# Patient Record
Sex: MALE | Race: BLACK OR AFRICAN AMERICAN | NOT HISPANIC OR LATINO | Employment: UNEMPLOYED | URBAN - METROPOLITAN AREA
[De-identification: names, ages, dates, MRNs, and addresses within clinical notes are randomized per-mention and may not be internally consistent; named-entity substitution may affect disease eponyms.]

---

## 2024-10-29 ENCOUNTER — HOSPITAL ENCOUNTER (EMERGENCY)
Facility: HOSPITAL | Age: 44
Discharge: HOME | End: 2024-10-29
Attending: EMERGENCY MEDICINE

## 2024-10-29 VITALS
HEART RATE: 74 BPM | DIASTOLIC BLOOD PRESSURE: 80 MMHG | TEMPERATURE: 99 F | OXYGEN SATURATION: 100 % | HEIGHT: 68 IN | WEIGHT: 170 LBS | RESPIRATION RATE: 18 BRPM | BODY MASS INDEX: 25.76 KG/M2 | SYSTOLIC BLOOD PRESSURE: 132 MMHG

## 2024-10-29 DIAGNOSIS — D64.9 ANEMIA, UNSPECIFIED TYPE: ICD-10-CM

## 2024-10-29 DIAGNOSIS — R21 RASH: ICD-10-CM

## 2024-10-29 DIAGNOSIS — R05.9 COUGH: ICD-10-CM

## 2024-10-29 DIAGNOSIS — J06.9 VIRAL URI WITH COUGH: Primary | ICD-10-CM

## 2024-10-29 DIAGNOSIS — R06.00 DYSPNEA, UNSPECIFIED TYPE: ICD-10-CM

## 2024-10-29 LAB
ALBUMIN SERPL BCP-MCNC: 4.1 G/DL (ref 3.5–5.2)
ALP SERPL-CCNC: 62 U/L (ref 40–150)
ALT SERPL W/O P-5'-P-CCNC: 15 U/L (ref 10–44)
ANION GAP SERPL CALC-SCNC: 8 MMOL/L (ref 8–16)
AST SERPL-CCNC: 20 U/L (ref 10–40)
BASOPHILS # BLD AUTO: 0.02 K/UL (ref 0–0.2)
BASOPHILS NFR BLD: 0.6 % (ref 0–1.9)
BILIRUB SERPL-MCNC: 0.4 MG/DL (ref 0.1–1)
BUN SERPL-MCNC: 14 MG/DL (ref 6–20)
CALCIUM SERPL-MCNC: 9.3 MG/DL (ref 8.7–10.5)
CHLORIDE SERPL-SCNC: 109 MMOL/L (ref 95–110)
CO2 SERPL-SCNC: 24 MMOL/L (ref 23–29)
CREAT SERPL-MCNC: 1.2 MG/DL (ref 0.5–1.4)
CTP QC/QA: YES
CTP QC/QA: YES
D DIMER PPP IA.FEU-MCNC: <0.19 MG/L FEU
DIFFERENTIAL METHOD BLD: ABNORMAL
EOSINOPHIL # BLD AUTO: 0 K/UL (ref 0–0.5)
EOSINOPHIL NFR BLD: 0.9 % (ref 0–8)
ERYTHROCYTE [DISTWIDTH] IN BLOOD BY AUTOMATED COUNT: 11.7 % (ref 11.5–14.5)
EST. GFR  (NO RACE VARIABLE): >60 ML/MIN/1.73 M^2
GLUCOSE SERPL-MCNC: 101 MG/DL (ref 70–110)
HCT VFR BLD AUTO: 39.8 % (ref 40–54)
HGB BLD-MCNC: 13.2 G/DL (ref 14–18)
IMM GRANULOCYTES # BLD AUTO: 0.01 K/UL (ref 0–0.04)
IMM GRANULOCYTES NFR BLD AUTO: 0.3 % (ref 0–0.5)
LYMPHOCYTES # BLD AUTO: 1.4 K/UL (ref 1–4.8)
LYMPHOCYTES NFR BLD: 41.5 % (ref 18–48)
MCH RBC QN AUTO: 29.6 PG (ref 27–31)
MCHC RBC AUTO-ENTMCNC: 33.2 G/DL (ref 32–36)
MCV RBC AUTO: 89 FL (ref 82–98)
MONOCYTES # BLD AUTO: 0.5 K/UL (ref 0.3–1)
MONOCYTES NFR BLD: 13.7 % (ref 4–15)
NEUTROPHILS # BLD AUTO: 1.4 K/UL (ref 1.8–7.7)
NEUTROPHILS NFR BLD: 43 % (ref 38–73)
NRBC BLD-RTO: 0 /100 WBC
PLATELET # BLD AUTO: 215 K/UL (ref 150–450)
PMV BLD AUTO: 9.5 FL (ref 9.2–12.9)
POC MOLECULAR INFLUENZA A AGN: NEGATIVE
POC MOLECULAR INFLUENZA B AGN: NEGATIVE
POTASSIUM SERPL-SCNC: 4.1 MMOL/L (ref 3.5–5.1)
PROT SERPL-MCNC: 7.7 G/DL (ref 6–8.4)
RBC # BLD AUTO: 4.46 M/UL (ref 4.6–6.2)
SARS-COV-2 RDRP RESP QL NAA+PROBE: NEGATIVE
SODIUM SERPL-SCNC: 141 MMOL/L (ref 136–145)
TREPONEMA PALLIDUM IGG+IGM AB [PRESENCE] IN SERUM OR PLASMA BY IMMUNOASSAY: REACTIVE
WBC # BLD AUTO: 3.35 K/UL (ref 3.9–12.7)

## 2024-10-29 PROCEDURE — 87635 SARS-COV-2 COVID-19 AMP PRB: CPT | Performed by: PHYSICIAN ASSISTANT

## 2024-10-29 PROCEDURE — 85025 COMPLETE CBC W/AUTO DIFF WBC: CPT | Performed by: PHYSICIAN ASSISTANT

## 2024-10-29 PROCEDURE — 99284 EMERGENCY DEPT VISIT MOD MDM: CPT | Mod: 25

## 2024-10-29 PROCEDURE — 87502 INFLUENZA DNA AMP PROBE: CPT

## 2024-10-29 PROCEDURE — 85379 FIBRIN DEGRADATION QUANT: CPT | Performed by: PHYSICIAN ASSISTANT

## 2024-10-29 PROCEDURE — 86593 SYPHILIS TEST NON-TREP QUANT: CPT | Performed by: PHYSICIAN ASSISTANT

## 2024-10-29 PROCEDURE — 86780 TREPONEMA PALLIDUM: CPT | Performed by: PHYSICIAN ASSISTANT

## 2024-10-29 PROCEDURE — 80053 COMPREHEN METABOLIC PANEL: CPT | Performed by: PHYSICIAN ASSISTANT

## 2024-10-29 PROCEDURE — 86592 SYPHILIS TEST NON-TREP QUAL: CPT | Performed by: PHYSICIAN ASSISTANT

## 2024-10-29 RX ORDER — BENZONATATE 100 MG/1
100 CAPSULE ORAL 3 TIMES DAILY PRN
Qty: 20 CAPSULE | Refills: 0 | Status: SHIPPED | OUTPATIENT
Start: 2024-10-29 | End: 2024-11-08

## 2024-10-29 RX ORDER — ONDANSETRON 4 MG/1
4 TABLET, FILM COATED ORAL EVERY 6 HOURS PRN
Qty: 12 TABLET | Refills: 0 | Status: SHIPPED | OUTPATIENT
Start: 2024-10-29

## 2024-10-29 RX ORDER — SALICYLIC ACID 0.03 G/ML
1 SHAMPOO TOPICAL DAILY
Qty: 325 ML | Refills: 0 | Status: SHIPPED | OUTPATIENT
Start: 2024-10-29

## 2024-10-29 RX ORDER — GUAIFENESIN 100 MG/5ML
100-200 SOLUTION ORAL EVERY 4 HOURS PRN
Qty: 60 ML | Refills: 0 | Status: SHIPPED | OUTPATIENT
Start: 2024-10-29 | End: 2024-11-08

## 2024-10-30 LAB — RPR SER QL: NORMAL

## 2024-11-01 ENCOUNTER — HOSPITAL ENCOUNTER (EMERGENCY)
Facility: HOSPITAL | Age: 44
Discharge: HOME | End: 2024-11-01
Attending: EMERGENCY MEDICINE

## 2024-11-01 VITALS
WEIGHT: 162 LBS | TEMPERATURE: 99 F | HEART RATE: 63 BPM | SYSTOLIC BLOOD PRESSURE: 130 MMHG | HEIGHT: 67 IN | OXYGEN SATURATION: 98 % | DIASTOLIC BLOOD PRESSURE: 86 MMHG | RESPIRATION RATE: 20 BRPM | BODY MASS INDEX: 25.43 KG/M2

## 2024-11-01 DIAGNOSIS — A53.9 SYPHILIS: Primary | ICD-10-CM

## 2024-11-01 LAB — T PALLIDUM AB SER QL IF: REACTIVE

## 2024-11-01 PROCEDURE — 63600175 PHARM REV CODE 636 W HCPCS: Mod: JZ,JG

## 2024-11-01 PROCEDURE — 96372 THER/PROPH/DIAG INJ SC/IM: CPT

## 2024-11-01 PROCEDURE — 99284 EMERGENCY DEPT VISIT MOD MDM: CPT | Mod: 25

## 2024-11-01 RX ADMIN — PENICILLIN G BENZATHINE 2.4 MILLION UNITS: 2400000 INJECTION, SUSPENSION INTRAMUSCULAR at 07:11

## 2024-11-01 NOTE — ED PROVIDER NOTES
Encounter Date: 11/1/2024       History     Chief Complaint   Patient presents with    Wound Check     Patient reports was called by a provider to come in for a shot      44-year-old male with no past medical history presents to ED for emergent evaluation of a penicillin shot.  Patient presented to this facility on 10/29/2024 where he had syphilis testing that tested positive.    He denies any fever, chills, chest pain, shortness of breath, abdominal pain, nausea, vomiting, diarrhea, dysuria, hematuria.  No other symptoms reported.    The history is provided by the patient. The history is limited by a language barrier. A  was used (Nurse Echols).     Review of patient's allergies indicates:  No Known Allergies  History reviewed. No pertinent past medical history.  History reviewed. No pertinent surgical history.  No family history on file.  Social History     Tobacco Use    Smoking status: Never    Smokeless tobacco: Never   Substance Use Topics    Alcohol use: Not Currently    Drug use: Never     Review of Systems   Constitutional:  Negative for chills and fever.   HENT:  Negative for congestion, ear pain, rhinorrhea and sore throat.    Eyes:  Negative for redness.   Respiratory:  Negative for cough and shortness of breath.    Cardiovascular:  Negative for chest pain.   Gastrointestinal:  Negative for abdominal pain, diarrhea, nausea and vomiting.   Genitourinary:  Negative for decreased urine volume, difficulty urinating, dysuria, frequency, hematuria and urgency.   Musculoskeletal:  Negative for back pain and neck pain.   Skin:  Positive for rash.   Neurological:  Negative for headaches.   Psychiatric/Behavioral:  Negative for confusion.        Physical Exam     Initial Vitals [11/01/24 1752]   BP Pulse Resp Temp SpO2   109/62 64 18 99 °F (37.2 °C) 98 %      MAP       --         Physical Exam    Nursing note and vitals reviewed.  Constitutional: He appears well-developed and well-nourished.  He is not diaphoretic.  Non-toxic appearance. No distress.   HENT:   Head: Normocephalic and atraumatic.   Right Ear: Hearing, tympanic membrane, external ear and ear canal normal. Tympanic membrane is not perforated, not erythematous and not bulging.   Left Ear: Hearing, tympanic membrane, external ear and ear canal normal. Tympanic membrane is not perforated, not erythematous and not bulging.   Nose: Nose normal. Mouth/Throat: Uvula is midline and oropharynx is clear and moist.   Eyes: Conjunctivae and EOM are normal.   Neck: Neck supple.   Normal range of motion.   Full passive range of motion without pain.     Cardiovascular:            Pulses:       Radial pulses are 2+ on the right side and 2+ on the left side.   Pulmonary/Chest: Effort normal and breath sounds normal. No respiratory distress. He has no decreased breath sounds.   Abdominal: Abdomen is soft and flat. There is no abdominal tenderness.   No right CVA tenderness.  No left CVA tenderness. There is no rebound and no guarding.   Musculoskeletal:      Cervical back: Full passive range of motion without pain, normal range of motion and neck supple. No rigidity.     Neurological: He is alert. No cranial nerve deficit.   Neuro intact.  Strength and sensation intact to bilateral upper and lower extremities.   Skin: Skin is warm and dry. No rash noted.         ED Course   Procedures  Labs Reviewed - No data to display       Imaging Results    None          Medications   penicillin G benzathine (BICILLIN LA) injection 2.4 Million Units (2.4 Million Units Intramuscular Given 11/1/24 1907)     Medical Decision Making  This is a 44-year-old male with no past medical history presents to ED for emergent evaluation of a penicillin shot.  Patient presented to this facility on 10/29/2024 where he had syphilis testing that tested positive.    He denies any fever, chills, chest pain, shortness of breath, abdominal pain, nausea, vomiting, diarrhea, dysuria, hematuria.   No other symptoms reported.    On physical exam, patient is well-appearing and in no acute distress.  Nontoxic appearing.  Lungs are clear to auscultation bilaterally.  Abdomen is soft and nontender.  No guarding, rigidity, rebound.  2+ radial pulses bilaterally.  Posterior oropharynx is not erythematous.  No edema or exudate.  Uvula midline.  Bilateral tympanic membrane is normal.  No erythema, bulging, or perforations.  Neuro intact.  Strength and sensation intact bilateral upper and lower extremities. Looking at patient's previous records on 10/29/2024 he was complaining of shortness of breath.  Given the symptoms I will treat patient for late stage syphilis.    Bicillin ordered.  Advised patient to come back once per week a for repeat bicillin shot for 3 weeks.  Advised patient to tell his sexual partners to also be tested and treated.  Urged prompt follow-up with PCP for further evaluation.    Strict return precautions given. I discussed with the patient/family the diagnosis, treatment plan, indications for return to the emergency department, and for expected follow-up. The patient/family verbalized an understanding. The patient/family is asked if there are any questions or concerns. We discuss the case, until all issues are addressed to the patient/family's satisfaction. Patient/family understands and is agreeable to the plan. Patient is stable and ready for discharge.      Risk  Prescription drug management.                                      Clinical Impression:  Final diagnoses:  [A53.9] Syphilis (Primary)          ED Disposition Condition    Discharge Stable          ED Prescriptions    None       Follow-up Information       Follow up With Specialties Details Why Contact [facility] -  Schedule an appointment as soon as possible for a visit in 2 days for further evaluation [address]  [place] [address]  [phone]      [facility] [place] Emergency Medicine In 2 days  If symptoms worsen [address] [facility] [place] [id number] [phone]             Nehemias Marroquin PA-C  11/01/24 1911

## 2024-11-01 NOTE — ED TRIAGE NOTES
"Pt to ED for evaluation s/p "being called by a doctor regarding positive test results". Pt was seen in the ED on 10/29/24 for rash to upper torso, and Per chart also tested (+) for FTA antibodies IgG and IgM, and (+) Treponema Pallidium Antibodies IgG and IgM. Pt denies any new symptoms today.   "

## 2024-11-02 NOTE — DISCHARGE INSTRUCTIONS
Tanpri retounen nan etablisman sa a lavelle 11/8/24 ak 11/15/24 santos repete piki penisilin.     Tanpri retounen nan Depatman Ijans santos nenpòt nouvo sentòm oswa sentòm ki jimenez pi grav tankou: lafyèv, doulè nan pwatrin, souf kout, pèt konesans, vètij, feblès, oswa nenpòt lòt enkyetid.     Tanpri robi Founisè Swen Prensipal ou a nan semèn nan. Si ou matt hceek, ou gldays cheek ki endike lavelle papye egzeyat ou a oswa ou ka rele goodo randevou klinik [facility] nan [phone] santos pran jay cheek.

## 2024-11-08 ENCOUNTER — HOSPITAL ENCOUNTER (EMERGENCY)
Facility: HOSPITAL | Age: 44
Discharge: HOME | End: 2024-11-08
Attending: EMERGENCY MEDICINE

## 2024-11-08 VITALS
TEMPERATURE: 99 F | OXYGEN SATURATION: 99 % | RESPIRATION RATE: 19 BRPM | BODY MASS INDEX: 26.48 KG/M2 | SYSTOLIC BLOOD PRESSURE: 129 MMHG | DIASTOLIC BLOOD PRESSURE: 84 MMHG | HEART RATE: 76 BPM | WEIGHT: 169.06 LBS

## 2024-11-08 DIAGNOSIS — J06.9 VIRAL URI WITH COUGH: ICD-10-CM

## 2024-11-08 DIAGNOSIS — A53.9 SYPHILIS: Primary | ICD-10-CM

## 2024-11-08 PROCEDURE — 96372 THER/PROPH/DIAG INJ SC/IM: CPT

## 2024-11-08 PROCEDURE — 63600175 PHARM REV CODE 636 W HCPCS: Mod: JZ,JG

## 2024-11-08 PROCEDURE — 99284 EMERGENCY DEPT VISIT MOD MDM: CPT | Mod: 25

## 2024-11-08 RX ORDER — FLUTICASONE PROPIONATE 50 MCG
1 SPRAY, SUSPENSION (ML) NASAL 2 TIMES DAILY PRN
Qty: 15 G | Refills: 0 | Status: SHIPPED | OUTPATIENT
Start: 2024-11-08

## 2024-11-08 RX ORDER — PREDNISONE 20 MG/1
40 TABLET ORAL DAILY
Qty: 10 TABLET | Refills: 0 | Status: SHIPPED | OUTPATIENT
Start: 2024-11-08 | End: 2024-11-13

## 2024-11-08 RX ORDER — CETIRIZINE HYDROCHLORIDE 10 MG/1
10 TABLET ORAL DAILY
Qty: 30 TABLET | Refills: 0 | Status: SHIPPED | OUTPATIENT
Start: 2024-11-08 | End: 2024-12-08

## 2024-11-08 RX ORDER — BENZONATATE 200 MG/1
200 CAPSULE ORAL 3 TIMES DAILY PRN
Qty: 20 CAPSULE | Refills: 0 | Status: SHIPPED | OUTPATIENT
Start: 2024-11-08

## 2024-11-08 RX ADMIN — PENICILLIN G BENZATHINE 2.4 MILLION UNITS: 1200000 INJECTION, SUSPENSION INTRAMUSCULAR at 03:11

## 2024-11-08 NOTE — DISCHARGE INSTRUCTIONS
Retounen nan 1 semèn santos 5yèm piki ou a santos trete sifilis    Asire w ou bwè anpil likid santos w rete idrate.  Ou ka pran Tessalon Perles roney sa nesesè santos tous.  Asire w ke w pran chak melonie Zyrtec nan maten yo ak Flonase espre nan nen.  Ranpli imani 5 melonie nan prednisone.  Santos liz w tata w pi byen:   o Sèvi ak yon imidite vapè leia santos evite respire lè sèk.   o Sèvi ak sirèt di oswa gout santos tous santos kalme gòj fè mal ak tous.   o Gargari ak dlo sale (melanje 1/2 ti kiyè sèl ak 1 tas dlo tyèd) kèk fwa pa melonie.   o Flite vapè dlo sale nan chak twou nen. Nenpòt espre saline nòmal ap travay.   o Siwo likid cho santos kenbe gòj ou imid.   o Pran douch cho ak vapè santos liz kalme tous la.  Pa fimkortney oswa pa rete nan kote ki gen lafimen. Evite bagay ki ka lakòz pwoblèm santos respire tankou vaping, lafimen, polisyon, pousyè, ak lòt alèrnguyễn komen.  Ou ka vle sèvi ak medikaman schneider preskripsyon santos alèji oswa rflu asid si tous ou se akòz adia nan pwoblèm sa yo.  Ou kapab lida itilize yon medikaman santos tous schneider preskripsyon.    Swiv ak PCP ou nan 7 melonie kate [place] yo oswa pi bonè santos re-evalyasyon sentòm ou yo.

## 2024-11-10 NOTE — ED PROVIDER NOTES
Encounter Date: 11/8/2024       History     Chief Complaint   Patient presents with    Follow-up     Pt presents to the ED today for for a penicillin shot. Pt reports he was told to come in today to get his 2nd penicillin shot for treatment of syphilis. Pt is also following up for a cough that he has had since 10/29, states he was seen here for that as well and sent home with Rx with no relief.      44 y.o. male with no chronic PMHx presents for emergent evaluation of second penicillin shot.  Patient presented to this facility on 10/29/2024 where he had syphilis testing that tested positive.  He also reports persistent dry cough worse with lying down X 1 week.  Full workup completed at the same time as syphilis testing which was unremarkable.  He was prescribed Tessalon Perles as well as advised on over-the-counter Zyrtec and Flonase but states he has taken only the Perles with no relief of symptoms.  He denies chest pain or SOB.  He is tolerating PO without difficulty.Patient has not taken any other medications at this time. Patient denies denies any fever, chills, abdominal pain, nausea, vomiting, diarrhea, dysuria, hematuria, weight loss, night sweats, myalgias or any other complaints at this time.    The history is provided by the patient. The history is limited by a language barrier. A  was used (#[id number]).     Review of patient's allergies indicates:  No Known Allergies  History reviewed. No pertinent past medical history.  History reviewed. No pertinent surgical history.  No family history on file.  Social History     Tobacco Use    Smoking status: Never    Smokeless tobacco: Never   Substance Use Topics    Alcohol use: Not Currently    Drug use: Never     Review of Systems   Constitutional:  Negative for chills and fever.   HENT:  Positive for rhinorrhea. Negative for congestion and sore throat.    Respiratory:  Positive for cough. Negative for shortness of breath and wheezing.     Cardiovascular:  Negative for chest pain.   Gastrointestinal:  Negative for abdominal pain, diarrhea, nausea and vomiting.   Genitourinary:  Negative for decreased urine volume, dysuria, hematuria and testicular pain.   Musculoskeletal:  Negative for myalgias.   Skin:  Negative for rash.   Neurological:  Negative for weakness and headaches.   Psychiatric/Behavioral: Negative.         Physical Exam     Initial Vitals [11/08/24 1457]   BP Pulse Resp Temp SpO2   129/84 76 19 98.5 °F (36.9 °C) 99 %      MAP       --         Physical Exam    Nursing note and vitals reviewed.  Constitutional: He appears well-developed and well-nourished. He is not diaphoretic. No distress.   HENT:   Head: Normocephalic and atraumatic.   Right Ear: External ear normal.   Left Ear: External ear normal. Mouth/Throat: Mucous membranes are normal.   Mild posterior oropharyngeal erythema with postnasal drip, no tonsillar swelling, no exudates, uvula is midline. No muffled voice. No tripod posturing. Tolerating secretions without difficulty.  Speaking in full sentences on exam without difficulty.  Bilateral tympanic membranes are pearly gray without erythema, bulging, perforation. There is no postauricular swelling, or overlying erythema or tenderness to palpation over mastoids bilaterally. Nares patent with bilateral enlarged nasal turbinates.     Eyes: Conjunctivae and EOM are normal. Pupils are equal, round, and reactive to light. Right conjunctiva is not injected. Left conjunctiva is not injected. No scleral icterus.   Neck: Neck supple. No tracheal deviation present. No JVD present.   Normal range of motion.   Full passive range of motion without pain.     Cardiovascular:  Normal rate, regular rhythm, S1 normal, S2 normal, normal heart sounds and intact distal pulses.           Pulses:       Radial pulses are 2+ on the right side and 2+ on the left side.   Pulmonary/Chest: Effort normal and breath sounds normal. No respiratory distress.  He has no wheezes. He has no rales.   Abdominal: Abdomen is soft. He exhibits no distension. There is no abdominal tenderness.   Musculoskeletal:         General: Normal range of motion.      Cervical back: Full passive range of motion without pain, normal range of motion and neck supple.     Neurological: He is alert and oriented to person, place, and time. He has normal strength. No cranial nerve deficit or sensory deficit. Gait normal.   Skin: Skin is warm. No ecchymosis and no rash noted.   Psychiatric: He has a normal mood and affect. Thought content normal.         ED Course   Procedures  Labs Reviewed - No data to display       Imaging Results    None          Medications   penicillin G benzathine (BICILLIN LA) injection 2.4 Million Units (2.4 Million Units Intramuscular Given 11/8/24 1527)     Medical Decision Making  This is an evaluation of a 44 y.o. male that presents to the Emergency Department for second penicillin injection and persistent cough.  Confirmed syphilis on 10/29. Deny decrease urinary output or changes in oral intake. The patient is a non-toxic, afebrile, and well appearing male. On physical exam: the pharynx and ears are without evidence of infection. Mucus membranes are moist. No meningeal signs. Clear and equal breath sounds bilaterally with no adventitious breath sounds, tachypnea or respiratory distress. No evidence of hypoxia or cyanosis. RA SPO2: 99%.  Abdomen is soft, nontender without peritoneal signs. No rashes. No skin tenting. Vital Signs are stable and reassuring.    Prior labs and imaging reviewed.  Patient was diagnosed with viral URI and advised to take over-the-counter antihistamines and Flonase which he states he did not do.    Differentials Include: URI, pneumonia, UTI, meningitis, sepsis, viral syndrome, Otitis Media, Otitis Externa, Strep Pharyngitis. Given the above findings, my overall impression is URI.     Given 2nd dose of IM injection.  Advised to return in 1 week  for 3rd dose.  Take over-the-counter antihistamine such as Zyrtec and Flonase to control allergies to help improve cough.  Vital signs stable and reassuring. ED return precautions given for worsening symptoms, unusual behavior, shortness of breath/difficulty breathing, or new symptoms/concerns. Patient verbalized an understanding and agrees with treatment and discharge plan. All questions or concerns have been addressed.     Amount and/or Complexity of Data Reviewed  External Data Reviewed: labs, radiology, ECG and notes.    Risk  OTC drugs.  Prescription drug management.                                      Clinical Impression:  Final diagnoses:  [A53.9] Syphilis (Primary)  [J06.9] Viral URI with cough          ED Disposition Condition    Discharge Stable          ED Prescriptions       Medication Sig Dispense Start Date End Date Auth. Provider    cetirizine (ZYRTEC) 10 MG tablet Take 1 tablet (10 mg total) by mouth once daily. 30 tablet 11/8/2024 12/8/2024 Dara Burroughs PA-C    fluticasone propionate (FLONASE) 50 mcg/actuation nasal spray 1 spray (50 mcg total) by Each Nostril route 2 (two) times daily as needed for Rhinitis or Allergies. 15 g 11/8/2024 -- Dara Burroughs PA-C    predniSONE (DELTASONE) 20 MG tablet Take 2 tablets (40 mg total) by mouth once daily. for 5 days 10 tablet 11/8/2024 11/13/2024 Dara Burroughs PA-C    benzonatate (TESSALON) 200 MG capsule Take 1 capsule (200 mg total) by mouth 3 (three) times daily as needed for Cough. 20 capsule 11/8/2024 -- Dara Burroughs PA-C          Follow-up Information       Follow up With Specialties Details Why Contact Info    [facility] - [place] - Emergency Dept Emergency Medicine Go to  For new or worsening symptoms [address]  [facility] [place] [address] [phone]    [facility] -  Schedule an appointment as soon as possible for a visit   [address]  [place]  [address]  [phone]               Dara Burroughs PA-C  11/10/24 7058

## 2024-11-16 ENCOUNTER — HOSPITAL ENCOUNTER (EMERGENCY)
Facility: HOSPITAL | Age: 44
Discharge: HOME | End: 2024-11-16
Attending: EMERGENCY MEDICINE

## 2024-11-16 VITALS
SYSTOLIC BLOOD PRESSURE: 129 MMHG | OXYGEN SATURATION: 97 % | HEART RATE: 84 BPM | BODY MASS INDEX: 26.47 KG/M2 | TEMPERATURE: 99 F | DIASTOLIC BLOOD PRESSURE: 103 MMHG | WEIGHT: 169 LBS | RESPIRATION RATE: 18 BRPM

## 2024-11-16 DIAGNOSIS — A53.9 SYPHILIS: Primary | ICD-10-CM

## 2024-11-16 PROCEDURE — 63600175 PHARM REV CODE 636 W HCPCS: Mod: JZ,JG | Performed by: PHYSICIAN ASSISTANT

## 2024-11-16 PROCEDURE — 96372 THER/PROPH/DIAG INJ SC/IM: CPT | Performed by: PHYSICIAN ASSISTANT

## 2024-11-16 PROCEDURE — 99284 EMERGENCY DEPT VISIT MOD MDM: CPT | Mod: 25

## 2024-11-16 RX ADMIN — PENICILLIN G BENZATHINE 2.4 MILLION UNITS: 1200000 INJECTION, SUSPENSION INTRAMUSCULAR at 05:11

## 2024-11-16 NOTE — ED PROVIDER NOTES
Encounter Date: 11/16/2024       History     Chief Complaint   Patient presents with    Syphilis     Pt to ED for 3rd penicillin injection      Chief Complaint:  Syphilis  History of  Present Illness: History obtained from patient. This 44 y.o. male who has no known past medical history presents to the ED for 3rd shot of penicillin to treat syphilis.  Patient denies symptoms at this time.      Review of patient's allergies indicates:  No Known Allergies  History reviewed. No pertinent past medical history.  History reviewed. No pertinent surgical history.  No family history on file.  Social History     Tobacco Use    Smoking status: Never    Smokeless tobacco: Never   Substance Use Topics    Alcohol use: Not Currently    Drug use: Never     Review of Systems   Constitutional:  Negative for chills and fever.   HENT:  Negative for congestion, rhinorrhea and sore throat.    Eyes:  Negative for visual disturbance.   Respiratory:  Negative for cough and shortness of breath.    Cardiovascular:  Negative for chest pain.   Gastrointestinal:  Negative for abdominal pain, diarrhea, nausea and vomiting.   Genitourinary:  Negative for dysuria, frequency and hematuria.   Musculoskeletal:  Negative for back pain.   Skin:  Negative for rash.   Neurological:  Negative for dizziness, weakness and headaches.       Physical Exam     Initial Vitals [11/16/24 1647]   BP Pulse Resp Temp SpO2   (!) 129/103 84 18 98.7 °F (37.1 °C) 97 %      MAP       --         Physical Exam    Nursing note and vitals reviewed.  Constitutional: He appears well-developed and well-nourished. No distress.   HENT:   Head: Normocephalic and atraumatic.   Right Ear: Tympanic membrane normal.   Left Ear: Tympanic membrane normal.   Nose: Nose normal. Mouth/Throat: Uvula is midline, oropharynx is clear and moist and mucous membranes are normal.   Eyes: EOM are normal. Pupils are equal, round, and reactive to light.   Neck: Trachea normal and phonation normal. Neck  supple. No stridor present.   Normal range of motion.   Full passive range of motion without pain.     Cardiovascular:      Exam reveals no gallop and no friction rub.       No murmur heard.  Pulmonary/Chest: Effort normal.   Abdominal: Abdomen is soft. Bowel sounds are normal. He exhibits no mass. There is no abdominal tenderness. There is no rebound and no guarding.   Musculoskeletal:         General: Normal range of motion.      Cervical back: Full passive range of motion without pain, normal range of motion and neck supple. No rigidity. No spinous process tenderness or muscular tenderness. Normal range of motion.     Neurological: He is alert and oriented to person, place, and time. He has normal strength. No cranial nerve deficit or sensory deficit.   Skin: Skin is warm and dry. Capillary refill takes less than 2 seconds.   Psychiatric: He has a normal mood and affect.         ED Course   Procedures  Labs Reviewed - No data to display       Imaging Results    None          Medications   penicillin G benzathine (BICILLIN LA) injection 2.4 Million Units (2.4 Million Units Intramuscular Given 11/16/24 1748)     Medical Decision Making  This is an evaluation of a 44 y.o. male who presents to the ED for 3rd shot of penicillin to treat syphilis.  Vital signs are stable.   Afebrile.  Patient is nontoxic appearing and in no acute distress.     Patient received 2.4 million units of penicillin G.    Patient given return precautions and instructed to return to the emergency department for any new or worsening symptoms. Patient verbalized understanding and agreed with plan. Encouraged follow-up with PCP.      Risk  Prescription drug management.                                      Clinical Impression:  Final diagnoses:  [A53.9] Syphilis (Primary)          ED Disposition Condition    Discharge Stable          ED Prescriptions    None       Follow-up Information       Follow up With Specialties Details Why Contact Info     Your PCP  Schedule an appointment as soon as possible for a visit in 1 day For reevaluation     [facility] Emergency Dept Emergency Medicine Go in 1 day If symptoms worsen [address] [facility] [place] [address]  [phone]             Jose De Jesus Russo PA-C  11/16/24 5322